# Patient Record
Sex: FEMALE | Race: BLACK OR AFRICAN AMERICAN
[De-identification: names, ages, dates, MRNs, and addresses within clinical notes are randomized per-mention and may not be internally consistent; named-entity substitution may affect disease eponyms.]

---

## 2017-02-11 NOTE — EKG
Date Performed: 02/11/2017       Time Performed: 08:52:10

 

PTAGE:      46 years

 

EKG:      Sinus rhythm 

 

 VOLTAGE CRITERIA FOR LVH ABNORMAL ECG Compared to prior tracing no significant change 

 

 PREVIOUS TRACING            : 12/26/2016 19.25

 

DOCTOR:   Kristopher Stone  Interpretating Date/Time  02/11/2017 13:10:10
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
## 2017-09-01 ENCOUNTER — HOSPITAL ENCOUNTER (EMERGENCY)
Dept: HOSPITAL 17 - NEPK | Age: 47
Discharge: HOME | End: 2017-09-01
Payer: COMMERCIAL

## 2017-09-01 VITALS
SYSTOLIC BLOOD PRESSURE: 167 MMHG | HEART RATE: 96 BPM | DIASTOLIC BLOOD PRESSURE: 98 MMHG | OXYGEN SATURATION: 98 % | TEMPERATURE: 98.4 F | RESPIRATION RATE: 18 BRPM

## 2017-09-01 VITALS — WEIGHT: 154.32 LBS | HEIGHT: 62 IN | BODY MASS INDEX: 28.4 KG/M2

## 2017-09-01 DIAGNOSIS — I10: ICD-10-CM

## 2017-09-01 DIAGNOSIS — Z87.39: ICD-10-CM

## 2017-09-01 DIAGNOSIS — Z72.0: ICD-10-CM

## 2017-09-01 DIAGNOSIS — S50.02XA: Primary | ICD-10-CM

## 2017-09-01 DIAGNOSIS — E11.9: ICD-10-CM

## 2017-09-01 DIAGNOSIS — Z86.79: ICD-10-CM
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
## 2017-09-01 DIAGNOSIS — M25.512: ICD-10-CM

## 2017-09-01 DIAGNOSIS — M79.645: ICD-10-CM

## 2017-09-01 PROCEDURE — 73080 X-RAY EXAM OF ELBOW: CPT

## 2017-09-01 PROCEDURE — 73030 X-RAY EXAM OF SHOULDER: CPT

## 2017-09-01 PROCEDURE — 99283 EMERGENCY DEPT VISIT LOW MDM: CPT
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
## 2018-02-11 ENCOUNTER — HOSPITAL ENCOUNTER (EMERGENCY)
Dept: HOSPITAL 17 - NEPD | Age: 48
Discharge: HOME | End: 2018-02-11
Payer: COMMERCIAL

## 2018-02-11 VITALS — WEIGHT: 149.91 LBS | BODY MASS INDEX: 27.59 KG/M2 | HEIGHT: 62 IN

## 2018-02-11 VITALS
TEMPERATURE: 98.5 F | RESPIRATION RATE: 13 BRPM | OXYGEN SATURATION: 98 % | HEART RATE: 85 BPM | SYSTOLIC BLOOD PRESSURE: 166 MMHG | DIASTOLIC BLOOD PRESSURE: 104 MMHG

## 2018-02-11 DIAGNOSIS — I10: ICD-10-CM

## 2018-02-11 DIAGNOSIS — K04.7: Primary | ICD-10-CM

## 2018-02-11 DIAGNOSIS — E11.9: ICD-10-CM

## 2018-02-11 DIAGNOSIS — F17.200: ICD-10-CM

## 2018-02-11 DIAGNOSIS — Z79.899: ICD-10-CM

## 2018-02-11 PROCEDURE — 96372 THER/PROPH/DIAG INJ SC/IM: CPT

## 2018-02-11 PROCEDURE — 99283 EMERGENCY DEPT VISIT LOW MDM: CPT

## 2018-02-11 NOTE — PD
HPI


Chief Complaint:  Oral / Dental Pain or Problem


Time Seen by Provider:  07:35


Travel History


International Travel<30 days:  No


Contact w/Intl Traveler<30days:  No


Traveled to known affect area:  No





History of Present Illness


HPI


47-year-old female presents to the emergency Department with complaint of left 

upper tooth pain 10 days.  Denies fever, vomiting.  Denies sore throat, 

difficulty swallowing, unusual drooling.  Has had similar tooth pain in the 

past and was scared to follow-up with a dentist.  Rates pain 10/10.  Has been 

taking Tylenol and Advil PM for symptom management.  Aggravated with eating, 

brushing her teeth, palpation.  No known relieving factors.  No known 

allergies.  No primary care provider.  History of hypertension and takes 

lisinopril; diabetes mellitus and takes metformin.  Says her blood sugars have 

been "good."  Did not take her lisinopril this morning.  Has no other medical 

complaints.  No other modifying factors or associated signs and symptoms.





PFSH


Past Medical History


Hx Anticoagulant Therapy:  No


Heart Rhythm Problems:  No


Cardiac Catheterization:  No


Cardiovascular Problems:  Yes (HTN)


High Cholesterol:  No


Chemotherapy:  No


Congestive Heart Failure:  No


Cerebrovascular Accident:  No


Diabetes:  Yes


Diminished Hearing:  No


Heparin Induced Thrombocytopen:  No


Hypertension:  Yes


Musculoskeletal:  Yes (left scapula pain, right scapula pain w/numbness in left 

hand)


Respiratory:  No


Immunizations Current:  Yes


Menopausal:  Yes


:  5


Para:  4


Miscarriage:  1


Tubal Ligation:  Yes





Past Surgical History


Appendectomy:  Yes


 Section:  Yes (3)


Coronary Artery Bypass Graft:  No


Hysterectomy:  No


Other Surgery:  Yes (LUMPECTOMY RIGHT BREAST)





Social History


Alcohol Use:  Yes (BEER OCCASIONALLY)


Tobacco Use:  Yes (< 1 PACK WEEKLY )


Substance Use:  No





Allergies-Medications


(Allergen,Severity, Reaction):  


Coded Allergies:  


     No Known Allergies (Verified  Adverse Reaction, Unknown, 18)


Reported Meds & Prescriptions





Reported Meds & Active Scripts


Active


Deltasone (Prednisone) 20 Mg Tab 40 Mg PO DAILY 4 Days


     start 2018


Tramadol (Tramadol HCl) 50 Mg Tab 50 Mg PO Q4H PRN


Clindamycin (Clindamycin HCl) 150 Mg Cap 450 Mg PO Q6H 10 Days


Peridex Liq (Chlorhexidine Gluconate (Mouth) Liq) 0.12% Soln 15 Ml SWISH-SPIT 

BID 10 Days


Ibuprofen 800 Mg Tab 800 Mg PO Q6HR PRN


Reported


Metformin (Metformin HCl) 500 Mg Tab 500 Mg PO DAILY


     With a meal


Lisinopril 10 Mg Tab 20 Mg PO BID








Review of Systems


Except as stated in HPI:  all other systems reviewed are Neg





Physical Exam


Narrative


GENERAL: Well-nourished, well-developed black female patient, in no acute 

distress; afebrile, nontoxic-appearing


SKIN: Warm and dry.


HEAD: Atraumatic. Normocephalic.  Left facial edema to the maxillary area; 

without erythema; with tenderness on palpation.  No lymphadenopathy.  


EYES: Pupils equal and round. No scleral icterus. No injection or drainage.


ENT: Mucosa pink and moist. No erythema or exudates. No uvular edema. No uvular

, palatal, or tonsillar deviation.  


Airway patent. 


EARS: Bilateral pinnae and external canals appear within normal limits. 

Bilateral tympanic membranes without  


erythema, dullness or perforation.


MOUTH: Mucous membranes moist, no lesions, tongue and gums appear normal.  Left 

upper wisdom tooth with tenderness on palpation; multiple dental cavities 

noted.  No fluctuance or outpouching to the gingiva.  Surrounding gingiva is 

without erythema, edema, drainage.  No obvious abscess noted.  


NECK: Trachea midline.  No lymphadenopathy.  


CARDIOVASCULAR: Regular rate.  


RESPIRATORY: No accessory muscle use.


GASTROINTESTINAL: Rounded.


MUSCULOSKELETAL: No obvious deformities. No clubbing.  No cyanosis.  No edema. 


NEUROLOGICAL: Awake and alert.  Oriented 3.  No obvious cranial nerve 

deficits.  Motor grossly within normal limits. Normal speech. 


PSYCHIATRIC: Appropriate mood and affect; insight and judgment normal.





Data


Data


Last Documented VS





Vital Signs








  Date Time  Temp Pulse Resp B/P (MAP) Pulse Ox O2 Delivery O2 Flow Rate FiO2


 


18 07:20 98.5 85 13 166/104 (124) 98   








Orders





 Orders


Clindamycin Inj (Cleocin Inj) (18 08:00)


Ketorolac Inj (Toradol Inj) (18 08:00)


Prednisone (Deltasone) (18 08:00)


Ed Discharge Order (18 07:56)








[facility]


Medical Decision Making


Medical Screen Exam Complete:  Yes


Emergency Medical Condition:  Yes


Medical Record Reviewed:  Yes


Differential Diagnosis


Dentalgia, dental abscess, gingivitis, dental caries


Narrative Course


47-year-old female with dental abscess of left upper tooth.  There is no 

fluctuance or outpouching on exam.  The patient has left facial edema to the 

maxillary area.  Without erythema.  She is afebrile and nontoxic-appearing.  

Denies fever, vomiting.  Patient has plan to follow up with a dentist tomorrow.

  Clindamycin 600 mg IM, Deltasone, Toradol administered in the ER.  Clindamycin

, ibuprofen, tramadol, Deltasone, Peridex mouth rinse prescribed for home.  

Instructed patient to follow up with dentist.  Instructed patient to follow up 

with primary care provider.  Patient verbalizes understanding and agreement 

with treatment plan.  Patient is medically cleared and stable for discharge.  

Discussed reasons to return to the emergency department.  Patient agrees with 

treatment plan.  The patients vital signs are stable and the patient is stable 

for outpatient follow-up and treatment.  Patient discharged home, stable and in 

no acute distress.





Diagnosis





 Primary Impression:  


 Dental abscess


Referrals:  


Dentist





Primary Care Physician


Patient Instructions:  Dental Abscess (ED), Dental Caries (ED), General 

Instructions, Toothache (ED)


Departure Forms:  Tests/Procedures, Work Release   Enter return to work date:  

2018





***Additional Instructions:  


Complete full course of antibiotics


Ibuprofen or Tylenol as directed and as needed to reduce pain and inflammation


Use Peridex as directed for oral hygiene


Warm or cool compresses to the affected area


Follow-up with dentist


Follow-up with primary care provider


Return to emergency department immediately with worsening of symptoms


***Med/Other Pt SpecificInfo:  Prescription(s) given


Scripts


Prednisone (Deltasone) 20 Mg Tab


40 MG PO DAILY for 4 Days, #8 TAB 0 Refills


   start 2018


   Prov: Shira Paredes         18 


Tramadol (Tramadol) 50 Mg Tab


50 MG PO Q4H Y for PAIN, #12 TAB 0 Refills


   Prov: Shira Paredes         18 


Clindamycin (Clindamycin) 150 Mg Cap


450 MG PO Q6H for Infection for 10 Days, #120 CAP 0 Refills


   Prov: Shira ParedesP         18 


Chlorhexidine Gluconate (Mouth) Liq (Peridex Liq) 0.12% Soln


15 ML SWISH-SPIT BID for 10 Days, #300 ML 0 Refills


   Prov: Shira Paredes         18 


Ibuprofen (Ibuprofen) 800 Mg Tab


800 MG PO Q6HR Y for PAIN, #30 TAB 0 Refills


   Prov: Shira Paredes         18


Disposition:  01 DISCHARGE HOME


Condition:  Stable











Shira Paredes 2018 07:47